# Patient Record
Sex: MALE | Race: WHITE | NOT HISPANIC OR LATINO | URBAN - METROPOLITAN AREA
[De-identification: names, ages, dates, MRNs, and addresses within clinical notes are randomized per-mention and may not be internally consistent; named-entity substitution may affect disease eponyms.]

---

## 2021-12-26 ENCOUNTER — EMERGENCY (EMERGENCY)
Facility: HOSPITAL | Age: 21
LOS: 1 days | Discharge: HOME | End: 2021-12-26
Attending: EMERGENCY MEDICINE | Admitting: EMERGENCY MEDICINE
Payer: COMMERCIAL

## 2021-12-26 VITALS
RESPIRATION RATE: 18 BRPM | OXYGEN SATURATION: 98 % | TEMPERATURE: 99 F | HEART RATE: 89 BPM | DIASTOLIC BLOOD PRESSURE: 84 MMHG | SYSTOLIC BLOOD PRESSURE: 128 MMHG

## 2021-12-26 VITALS
OXYGEN SATURATION: 95 % | SYSTOLIC BLOOD PRESSURE: 121 MMHG | DIASTOLIC BLOOD PRESSURE: 72 MMHG | WEIGHT: 179.9 LBS | TEMPERATURE: 102 F | HEART RATE: 135 BPM | HEIGHT: 66 IN | RESPIRATION RATE: 18 BRPM

## 2021-12-26 DIAGNOSIS — R00.2 PALPITATIONS: ICD-10-CM

## 2021-12-26 DIAGNOSIS — D69.0 ALLERGIC PURPURA: ICD-10-CM

## 2021-12-26 DIAGNOSIS — Z20.822 CONTACT WITH AND (SUSPECTED) EXPOSURE TO COVID-19: ICD-10-CM

## 2021-12-26 DIAGNOSIS — R00.0 TACHYCARDIA, UNSPECIFIED: ICD-10-CM

## 2021-12-26 LAB
ALBUMIN SERPL ELPH-MCNC: 4.7 G/DL — SIGNIFICANT CHANGE UP (ref 3.3–5)
ALP SERPL-CCNC: 107 U/L — SIGNIFICANT CHANGE UP (ref 40–120)
ALT FLD-CCNC: 29 U/L — SIGNIFICANT CHANGE UP (ref 10–45)
AMPHET UR-MCNC: NEGATIVE — SIGNIFICANT CHANGE UP
ANION GAP SERPL CALC-SCNC: 13 MMOL/L — SIGNIFICANT CHANGE UP (ref 5–17)
APPEARANCE UR: ABNORMAL
APTT BLD: 33.2 SEC — SIGNIFICANT CHANGE UP (ref 27.5–35.5)
AST SERPL-CCNC: 38 U/L — SIGNIFICANT CHANGE UP (ref 10–40)
BACTERIA # UR AUTO: PRESENT /HPF
BARBITURATES UR SCN-MCNC: NEGATIVE — SIGNIFICANT CHANGE UP
BASOPHILS # BLD AUTO: 0.07 K/UL — SIGNIFICANT CHANGE UP (ref 0–0.2)
BASOPHILS NFR BLD AUTO: 0.7 % — SIGNIFICANT CHANGE UP (ref 0–2)
BENZODIAZ UR-MCNC: NEGATIVE — SIGNIFICANT CHANGE UP
BILIRUB SERPL-MCNC: 0.7 MG/DL — SIGNIFICANT CHANGE UP (ref 0.2–1.2)
BILIRUB UR-MCNC: ABNORMAL
BUN SERPL-MCNC: 12 MG/DL — SIGNIFICANT CHANGE UP (ref 7–23)
CALCIUM SERPL-MCNC: 9.7 MG/DL — SIGNIFICANT CHANGE UP (ref 8.4–10.5)
CHLORIDE SERPL-SCNC: 98 MMOL/L — SIGNIFICANT CHANGE UP (ref 96–108)
CO2 SERPL-SCNC: 26 MMOL/L — SIGNIFICANT CHANGE UP (ref 22–31)
COCAINE METAB.OTHER UR-MCNC: NEGATIVE — SIGNIFICANT CHANGE UP
COLOR SPEC: YELLOW — SIGNIFICANT CHANGE UP
CREAT SERPL-MCNC: 1.03 MG/DL — SIGNIFICANT CHANGE UP (ref 0.5–1.3)
CRP SERPL-MCNC: 259.8 MG/L — HIGH (ref 0–4)
DIFF PNL FLD: ABNORMAL
EOSINOPHIL # BLD AUTO: 0.37 K/UL — SIGNIFICANT CHANGE UP (ref 0–0.5)
EOSINOPHIL NFR BLD AUTO: 3.4 % — SIGNIFICANT CHANGE UP (ref 0–6)
EPI CELLS # UR: SIGNIFICANT CHANGE UP /HPF (ref 0–5)
ERYTHROCYTE [SEDIMENTATION RATE] IN BLOOD: 45 MM/HR — HIGH
GLUCOSE SERPL-MCNC: 114 MG/DL — HIGH (ref 70–99)
GLUCOSE UR QL: NEGATIVE — SIGNIFICANT CHANGE UP
GRAN CASTS # UR COMP ASSIST: ABNORMAL /LPF
HCT VFR BLD CALC: 40.7 % — SIGNIFICANT CHANGE UP (ref 39–50)
HGB BLD-MCNC: 14.4 G/DL — SIGNIFICANT CHANGE UP (ref 13–17)
HIV 1+2 AB+HIV1 P24 AG SERPL QL IA: SIGNIFICANT CHANGE UP
IMM GRANULOCYTES NFR BLD AUTO: 0.3 % — SIGNIFICANT CHANGE UP (ref 0–1.5)
INR BLD: 1.37 — HIGH (ref 0.88–1.16)
KETONES UR-MCNC: 40 MG/DL
LACTATE SERPL-SCNC: 1 MMOL/L — SIGNIFICANT CHANGE UP (ref 0.5–2)
LEUKOCYTE ESTERASE UR-ACNC: NEGATIVE — SIGNIFICANT CHANGE UP
LYMPHOCYTES # BLD AUTO: 1.02 K/UL — SIGNIFICANT CHANGE UP (ref 1–3.3)
LYMPHOCYTES # BLD AUTO: 9.5 % — LOW (ref 13–44)
MCHC RBC-ENTMCNC: 29.6 PG — SIGNIFICANT CHANGE UP (ref 27–34)
MCHC RBC-ENTMCNC: 35.4 GM/DL — SIGNIFICANT CHANGE UP (ref 32–36)
MCV RBC AUTO: 83.6 FL — SIGNIFICANT CHANGE UP (ref 80–100)
METHADONE UR-MCNC: NEGATIVE — SIGNIFICANT CHANGE UP
MONOCYTES # BLD AUTO: 1.13 K/UL — HIGH (ref 0–0.9)
MONOCYTES NFR BLD AUTO: 10.5 % — SIGNIFICANT CHANGE UP (ref 2–14)
NEUTROPHILS # BLD AUTO: 8.11 K/UL — HIGH (ref 1.8–7.4)
NEUTROPHILS NFR BLD AUTO: 75.6 % — SIGNIFICANT CHANGE UP (ref 43–77)
NITRITE UR-MCNC: POSITIVE
NRBC # BLD: 0 /100 WBCS — SIGNIFICANT CHANGE UP (ref 0–0)
OPIATES UR-MCNC: NEGATIVE — SIGNIFICANT CHANGE UP
PCP SPEC-MCNC: SIGNIFICANT CHANGE UP
PCP UR-MCNC: NEGATIVE — SIGNIFICANT CHANGE UP
PH UR: 6.5 — SIGNIFICANT CHANGE UP (ref 5–8)
PLATELET # BLD AUTO: 193 K/UL — SIGNIFICANT CHANGE UP (ref 150–400)
POTASSIUM SERPL-MCNC: 3.6 MMOL/L — SIGNIFICANT CHANGE UP (ref 3.5–5.3)
POTASSIUM SERPL-SCNC: 3.6 MMOL/L — SIGNIFICANT CHANGE UP (ref 3.5–5.3)
PROT SERPL-MCNC: 8 G/DL — SIGNIFICANT CHANGE UP (ref 6–8.3)
PROT UR-MCNC: 30 MG/DL
PROTHROM AB SERPL-ACNC: 16.2 SEC — HIGH (ref 10.6–13.6)
RAPID RVP RESULT: SIGNIFICANT CHANGE UP
RBC # BLD: 4.87 M/UL — SIGNIFICANT CHANGE UP (ref 4.2–5.8)
RBC # FLD: 12 % — SIGNIFICANT CHANGE UP (ref 10.3–14.5)
RBC CASTS # UR COMP ASSIST: < 5 /HPF — SIGNIFICANT CHANGE UP
S PYO AG SPEC QL IA: NEGATIVE — SIGNIFICANT CHANGE UP
SARS-COV-2 RNA SPEC QL NAA+PROBE: SIGNIFICANT CHANGE UP
SODIUM SERPL-SCNC: 137 MMOL/L — SIGNIFICANT CHANGE UP (ref 135–145)
SP GR SPEC: 1.02 — SIGNIFICANT CHANGE UP (ref 1–1.03)
THC UR QL: NEGATIVE — SIGNIFICANT CHANGE UP
UROBILINOGEN FLD QL: 1 E.U./DL — SIGNIFICANT CHANGE UP
WBC # BLD: 10.73 K/UL — HIGH (ref 3.8–10.5)
WBC # FLD AUTO: 10.73 K/UL — HIGH (ref 3.8–10.5)
WBC UR QL: < 5 /HPF — SIGNIFICANT CHANGE UP

## 2021-12-26 PROCEDURE — 86780 TREPONEMA PALLIDUM: CPT

## 2021-12-26 PROCEDURE — 87081 CULTURE SCREEN ONLY: CPT

## 2021-12-26 PROCEDURE — 96375 TX/PRO/DX INJ NEW DRUG ADDON: CPT

## 2021-12-26 PROCEDURE — 83605 ASSAY OF LACTIC ACID: CPT

## 2021-12-26 PROCEDURE — 85652 RBC SED RATE AUTOMATED: CPT

## 2021-12-26 PROCEDURE — 87591 N.GONORRHOEAE DNA AMP PROB: CPT

## 2021-12-26 PROCEDURE — 71045 X-RAY EXAM CHEST 1 VIEW: CPT | Mod: 26

## 2021-12-26 PROCEDURE — 87389 HIV-1 AG W/HIV-1&-2 AB AG IA: CPT

## 2021-12-26 PROCEDURE — 76870 US EXAM SCROTUM: CPT | Mod: 26

## 2021-12-26 PROCEDURE — 87880 STREP A ASSAY W/OPTIC: CPT

## 2021-12-26 PROCEDURE — 99284 EMERGENCY DEPT VISIT MOD MDM: CPT | Mod: 25

## 2021-12-26 PROCEDURE — 84145 PROCALCITONIN (PCT): CPT

## 2021-12-26 PROCEDURE — 85730 THROMBOPLASTIN TIME PARTIAL: CPT

## 2021-12-26 PROCEDURE — 36415 COLL VENOUS BLD VENIPUNCTURE: CPT

## 2021-12-26 PROCEDURE — 96365 THER/PROPH/DIAG IV INF INIT: CPT

## 2021-12-26 PROCEDURE — 85025 COMPLETE CBC W/AUTO DIFF WBC: CPT

## 2021-12-26 PROCEDURE — 99285 EMERGENCY DEPT VISIT HI MDM: CPT

## 2021-12-26 PROCEDURE — 96361 HYDRATE IV INFUSION ADD-ON: CPT

## 2021-12-26 PROCEDURE — 93005 ELECTROCARDIOGRAM TRACING: CPT

## 2021-12-26 PROCEDURE — 87040 BLOOD CULTURE FOR BACTERIA: CPT

## 2021-12-26 PROCEDURE — 81001 URINALYSIS AUTO W/SCOPE: CPT

## 2021-12-26 PROCEDURE — 80053 COMPREHEN METABOLIC PANEL: CPT

## 2021-12-26 PROCEDURE — 80307 DRUG TEST PRSMV CHEM ANLYZR: CPT

## 2021-12-26 PROCEDURE — 87491 CHLMYD TRACH DNA AMP PROBE: CPT

## 2021-12-26 PROCEDURE — 76870 US EXAM SCROTUM: CPT

## 2021-12-26 PROCEDURE — 82962 GLUCOSE BLOOD TEST: CPT

## 2021-12-26 PROCEDURE — 85610 PROTHROMBIN TIME: CPT

## 2021-12-26 PROCEDURE — 71045 X-RAY EXAM CHEST 1 VIEW: CPT

## 2021-12-26 PROCEDURE — 0225U NFCT DS DNA&RNA 21 SARSCOV2: CPT

## 2021-12-26 PROCEDURE — 86140 C-REACTIVE PROTEIN: CPT

## 2021-12-26 RX ORDER — SODIUM CHLORIDE 9 MG/ML
1000 INJECTION INTRAMUSCULAR; INTRAVENOUS; SUBCUTANEOUS ONCE
Refills: 0 | Status: COMPLETED | OUTPATIENT
Start: 2021-12-26 | End: 2021-12-26

## 2021-12-26 RX ORDER — CEFPODOXIME PROXETIL 100 MG
1 TABLET ORAL
Qty: 14 | Refills: 0
Start: 2021-12-26 | End: 2022-01-01

## 2021-12-26 RX ORDER — KETOROLAC TROMETHAMINE 30 MG/ML
15 SYRINGE (ML) INJECTION ONCE
Refills: 0 | Status: DISCONTINUED | OUTPATIENT
Start: 2021-12-26 | End: 2021-12-26

## 2021-12-26 RX ORDER — ACETAMINOPHEN 500 MG
1000 TABLET ORAL ONCE
Refills: 0 | Status: COMPLETED | OUTPATIENT
Start: 2021-12-26 | End: 2021-12-26

## 2021-12-26 RX ORDER — CEFTRIAXONE 500 MG/1
1000 INJECTION, POWDER, FOR SOLUTION INTRAMUSCULAR; INTRAVENOUS ONCE
Refills: 0 | Status: COMPLETED | OUTPATIENT
Start: 2021-12-26 | End: 2021-12-26

## 2021-12-26 RX ADMIN — SODIUM CHLORIDE 1000 MILLILITER(S): 9 INJECTION INTRAMUSCULAR; INTRAVENOUS; SUBCUTANEOUS at 20:18

## 2021-12-26 RX ADMIN — Medication 15 MILLIGRAM(S): at 22:20

## 2021-12-26 RX ADMIN — Medication 1000 MILLIGRAM(S): at 18:40

## 2021-12-26 RX ADMIN — Medication 15 MILLIGRAM(S): at 20:59

## 2021-12-26 RX ADMIN — SODIUM CHLORIDE 1000 MILLILITER(S): 9 INJECTION INTRAMUSCULAR; INTRAVENOUS; SUBCUTANEOUS at 18:36

## 2021-12-26 RX ADMIN — CEFTRIAXONE 100 MILLIGRAM(S): 500 INJECTION, POWDER, FOR SOLUTION INTRAMUSCULAR; INTRAVENOUS at 22:22

## 2021-12-26 RX ADMIN — SODIUM CHLORIDE 1000 MILLILITER(S): 9 INJECTION INTRAMUSCULAR; INTRAVENOUS; SUBCUTANEOUS at 19:11

## 2021-12-26 RX ADMIN — SODIUM CHLORIDE 1000 MILLILITER(S): 9 INJECTION INTRAMUSCULAR; INTRAVENOUS; SUBCUTANEOUS at 22:00

## 2021-12-26 RX ADMIN — Medication 1000 MILLIGRAM(S): at 19:11

## 2021-12-26 RX ADMIN — CEFTRIAXONE 1000 MILLIGRAM(S): 500 INJECTION, POWDER, FOR SOLUTION INTRAMUSCULAR; INTRAVENOUS at 23:09

## 2021-12-26 NOTE — ED PROVIDER NOTE - CLINICAL SUMMARY MEDICAL DECISION MAKING FREE TEXT BOX
21 y/o M with onset of rash and fever and sore throat after covid booster on Thursday. 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 Consider triggered immune response due to booster, consider also by viral infection. Pt strep negative will re swab for strep as his initially hsp event occurred on setting of strep. Also consider hand foot mouth disease, screen for syphilis but no risk factors. Pt does not appear significantly unwell, highly doubt meningitis since pt is vaccinated for meningococcus. Initial plan for labs. hydration, Tylenol, Xray, UA, CXR, dispo pending workup and revaluation.

## 2021-12-26 NOTE — ED PROVIDER NOTE - CONSTITUTIONAL, MLM
normal... Pt appearing non toxic, awake, alert, oriented to person, place, time/situation and in no apparent distress.

## 2021-12-26 NOTE — ED ADULT NURSE REASSESSMENT NOTE - NS ED NURSE REASSESS COMMENT FT1
Patient a/oX3, anxious, c/o of generalized red rash and pustules, pruritic, w/ sore throat and headache, also c/o of groin pain, no dysuria.  Sinus tacchcyardia on EKG, other vitals stable.  Right AC PIv #20 in place, all labs, blood cultures sent.  NSS 2 L bolus, Tylenol PO adminsitered.  Brought to US in stable condition.  Results and disposition pending.

## 2021-12-26 NOTE — ED PROVIDER NOTE - PROGRESS NOTE DETAILS
Discussed with nephrology for a consultation US wnl, improving VS, pt non toxic appearing, no abd pain/discomfort, will advise close fu with peds/rheum this week, continue supportive care, nsaids. Mor: US wnl, improving VS, pt non toxic appearing, no abd pain/discomfort, will advise close fu with peds/rheum this week, continue supportive care, nsaids.strict return prec given.

## 2021-12-26 NOTE — ED ADULT NURSE REASSESSMENT NOTE - NS ED NURSE REASSESS COMMENT FT1
All labs, tests resulted, US resulted, Ceftriaxone , NSS bolus completed.  No c/o of headache or any other pain, vital signs stable.  Discharged to home in stable condition.

## 2021-12-26 NOTE — ED PROVIDER NOTE - MUSCULOSKELETAL, MLM
Spine appears normal, range of motion is not limited, no muscle or joint tenderness no neck tenderness or stiffness no meningeal signs

## 2021-12-26 NOTE — ED PROVIDER NOTE - NSFOLLOWUPINSTRUCTIONS_ED_ALL_ED_FT
PLEASE RETURN FOR ANY WORSENING RASH, FEVER, CHILLS, ABDOMINAL PAIN OR ANY OTHER CONCERNS.      Henoch-Schonlein Purpura    WHAT YOU NEED TO KNOW:    Henoch-Schonlein purpura (HSP) is a condition that causes your immune system to attack and damage your blood vessels. Damage to your blood vessels causes them to swell and bleed. HSP most commonly affects the blood vessels in your skin, joints, intestines, and kidneys. HSP can happen at any age but is most common in children 2 to 11 years of age. There is no treatment for HSP. HSP may eventually get better or become a chronic condition.     DISCHARGE INSTRUCTIONS:    Call 911 or have someone else call for any of the following:   •You have a seizure.       •You have trouble breathing.      Seek care immediately if:   •You are confused.       •You have a bruise that suddenly gets bigger.       •You feel dizzy or weak.       •Your abdomen looks bigger than usual and is very painful.       •You vomit blood or liquid that looks like coffee grounds.       •You stop urinating or urinate a lot less than usual.       •You have swelling in your face, arms, or legs.       Contact your healthcare provider if:   •You have a fever.      •You have blood in your bowel movements or urine.       •You cannot stop vomiting.       •You have questions or concerns about your condition or care.      Medicines: You may need any of the following:   •Medicine may be given to decrease swelling. You may also be given medicine to stop your immune system from attacking your blood vessels.       •Acetaminophen decreases pain and fever. It is available without a doctor's order. Ask how much to take and how often to take it. Follow directions. Read the labels of all other medicines you are using to see if they also contain acetaminophen, or ask your doctor or pharmacist. Acetaminophen can cause liver damage if not taken correctly. Do not use more than 4 grams (4,000 milligrams) total of acetaminophen in one day.       •NSAIDs, such as ibuprofen, help decrease swelling, pain, and fever. This medicine is available with or without a doctor's order. NSAIDs can cause stomach bleeding or kidney problems in certain people. If you take blood thinner medicine, always ask your healthcare provider if NSAIDs are safe for you. Always read the medicine label and follow directions.      •Take your medicine as directed. Contact your healthcare provider if you think your medicine is not helping or if you have side effects. Tell him of her if you are allergic to any medicine. Keep a list of the medicines, vitamins, and herbs you take. Include the amounts, and when and why you take them. Bring the list or the pill bottles to follow-up visits. Carry your medicine list with you in case of an emergency.      Manage your symptoms:   •Apply ice on your joints for 15 to 20 minutes every hour or as directed. Use an ice pack, or put crushed ice in a plastic bag. Cover it with a towel. Ice helps prevent tissue damage and decreases swelling and pain.      •Apply heat on your joints for 20 to 30 minutes every 2 hours for as many days as directed. Heat helps decrease pain.       •Elevate your joint above the level of your heart as often as you can. This will help decrease swelling and pain. Prop your joint on pillows or blankets to keep it elevated comfortably.       •Drink plenty of liquids as directed. Liquids help prevent dehydration from vomiting or diarrhea. Ask how much liquid to drink each day and which liquids are best for you.       •Rest as directed to help your body heal. Ask your healthcare provider when you can return to your normal activities.       Follow up with your healthcare provider as directed: You will need to return for blood or urine tests. Write down your questions so you remember to ask them during your visits.

## 2021-12-26 NOTE — ED PROVIDER NOTE - ENMT, MLM
Airway patent, Nasal mucosa clear. Mouth with normal mucosa. Throat has no vesicles, no oropharyngeal exudates and uvula is midline. Mild  erythematous throat, top rash on roof of mouth

## 2021-12-26 NOTE — ED PROVIDER NOTE - SKIN, MLM
Skin normal color for race, warm, dry and intact. Diffuse puric macular rash, non blanching no vesciles including palms and soles, no ulceration

## 2021-12-26 NOTE — ED ADULT TRIAGE NOTE - CHIEF COMPLAINT QUOTE
Pt presents to ED co fever, dizziness, palpitations and rash after receiving the covid booster on Thurday 12/23. Rash initially began in hands now on hands, legs and torso. Denies SOB.

## 2021-12-26 NOTE — ED ADULT NURSE NOTE - OBJECTIVE STATEMENT
Patient c/o of rash on hands and legs, now on torso and abdomen, no SOB, no fever or chills , received Covid vaccine, c/o of dizziness and palpitions, took Tylenol PO 1 hour ago. Patient c/o of rash on hands, noted raised red rash and pustules, started 3 days ago after receiving Pfizer Covid vaccine, spread to legs and feet with swelling the next today, today  now on torso, groin and abdomen, states itching, no discharge from site.  Also c/o of headache, sore throat, with nausea, no vomitting, c/o of dizziness and palpitations, no SOB, with fever 101o yesterday, 102o today,, took Tylenol PO 1 hour ago, and taking Benadryl PO every 6 hours.  Went to  and sent to ED, states rapid Covid and rapid Strep negative.  Immediate upgrade to Dr. Valentin due to fever and elevated .

## 2021-12-26 NOTE — ED PROVIDER NOTE - PATIENT PORTAL LINK FT
You can access the FollowMyHealth Patient Portal offered by [facility] by registering at the following website: [email or web address]. By joining [employer]’s FollowMyHealth portal, you will also be able to view your health information using other applications (apps) compatible with our system.

## 2021-12-26 NOTE — ED PROVIDER NOTE - OBJECTIVE STATEMENT
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 Pt had went last Thursday to get PCR to rule out COVID and was asymptomatic. Pt then received the booster vaccine and given the Pfizer booster shot and had prior doses of Moderna vaccine. Pt had no negative reactions, no significant reactions to Moderna vaccine. After vaccine pt noticed later that night he developed a new full body rash on hands, palms, and soles as well as groin and co of sore throat/mouth sores. Ever since he complains of having a fever and mild headache. Pt denies any neck stiffness, nausea, vomiting, diarrhea, no joint pain but is complaining of fatigue. Pt denies being sexually active, or any IV drug usage. Has been vaccinated for meningococcus.

## 2021-12-27 LAB — T PALLIDUM AB TITR SER: NEGATIVE — SIGNIFICANT CHANGE UP

## 2021-12-28 LAB
C TRACH RRNA SPEC QL NAA+PROBE: SIGNIFICANT CHANGE UP
N GONORRHOEA RRNA SPEC QL NAA+PROBE: SIGNIFICANT CHANGE UP
SPECIMEN SOURCE: SIGNIFICANT CHANGE UP

## 2021-12-31 LAB
CULTURE RESULTS: SIGNIFICANT CHANGE UP
CULTURE RESULTS: SIGNIFICANT CHANGE UP
SPECIMEN SOURCE: SIGNIFICANT CHANGE UP
SPECIMEN SOURCE: SIGNIFICANT CHANGE UP

## 2023-12-02 ENCOUNTER — HOSPITAL ENCOUNTER (EMERGENCY)
Facility: HOSPITAL | Age: 23
Discharge: HOME | End: 2023-12-02
Attending: EMERGENCY MEDICINE | Admitting: EMERGENCY MEDICINE
Payer: COMMERCIAL

## 2023-12-02 VITALS
HEART RATE: 96 BPM | SYSTOLIC BLOOD PRESSURE: 125 MMHG | RESPIRATION RATE: 20 BRPM | DIASTOLIC BLOOD PRESSURE: 57 MMHG | OXYGEN SATURATION: 97 % | TEMPERATURE: 97.2 F

## 2023-12-02 DIAGNOSIS — F10.920 ALCOHOLIC INTOXICATION WITHOUT COMPLICATION (HCC): Primary | ICD-10-CM

## 2023-12-02 PROCEDURE — 99283 EMERGENCY DEPT VISIT LOW MDM: CPT

## 2023-12-02 PROCEDURE — 99284 EMERGENCY DEPT VISIT MOD MDM: CPT | Performed by: EMERGENCY MEDICINE

## 2023-12-02 NOTE — ED ATTENDING ATTESTATION
12/2/2023  IJeffery MD, saw and evaluated the patient. I have discussed the patient with the resident/non-physician practitioner and agree with the resident's/non-physician practitioner's findings, Plan of Care, and MDM as documented in the resident's/non-physician practitioner's note, except where noted. All available labs and Radiology studies were reviewed. I was present for key portions of any procedure(s) performed by the resident/non-physician practitioner and I was immediately available to provide assistance. At this point I agree with the current assessment done in the Emergency Department. I have conducted an independent evaluation of this patient a history and physical is as follows:    ED Course     Emergency Department Note- Graham Pemberton 25 y.o. male MRN: [id number]    Unit/Bed#: [facility] Encounter: [id number]    Graham Pemberton is a 25 y.o. male who presents with   Chief Complaint   Patient presents with    Alcohol Intoxication     Patient arrived via EMS, was picked up outside restaurant after throwing up outside, VSS en route         History of Present Illness   HPI:  Graham Pemberton is a 25 y.o. male who presents for evaluation of:  Acute alcohol intoxication. Patient was noted to be vomiting outside of a restaurant when EMS picked him up to transport him to the ED. Patient notes that he was drinking alcohol earlier prior to arrival.  Patient did fall and had head strike. He denies loss of consciousness, headache, and neck pain.     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 No pertinent family history. Meds/Allergies   PTA meds:   Prior to Admission Medications   Prescriptions Last Dose Informant Patient Reported? Taking?   sertraline (ZOLOFT) 50 mg tablet   Yes No   Sig: Take 50 mg by mouth      Facility-Administered Medications: None     Allergies   Allergen Reactions    Atropine Other (See Comments)       Objective   First Vitals:   Blood Pressure: 125/57 (12/02/23 0241)  Pulse: 96 (12/02/23 0241)  Temperature: (!) 97.2 °F (36.2 °C) (12/02/23 0242)  Temp Source: Oral (12/02/23 0241)  Respirations: 20 (12/02/23 0241)  SpO2: 97 % (12/02/23 0241)    Current Vitals:   Blood Pressure: 125/57 (12/02/23 0241)  Pulse: 96 (12/02/23 0241)  Temperature: (!) 97.2 °F (36.2 °C) (12/02/23 0242)  Temp Source: Axillary (12/02/23 0242)  Respirations: 20 (12/02/23 0241)  SpO2: 97 % (12/02/23 0241)    No intake or output data in the 24 hours ending 12/02/23 0316    Invasive Devices       None                   Physical Exam  Vitals and nursing note reviewed. Constitutional:       General: He is in acute distress (x secondary to vomiting). Appearance: Normal appearance. He is well-developed. HENT:      Head: Normocephalic and atraumatic. Right Ear: External ear normal.      Left Ear: External ear normal.      Nose: Nose normal.      Mouth/Throat:      Pharynx: No oropharyngeal exudate. Eyes:      Conjunctiva/sclera: Conjunctivae normal.      Pupils: Pupils are equal, round, and reactive to light. Cardiovascular:      Rate and Rhythm: Normal rate and regular rhythm. Pulmonary:      Effort: Pulmonary effort is normal. No respiratory distress. Abdominal:      General: Abdomen is flat. There is no distension. Palpations: Abdomen is soft. Musculoskeletal:         General: No deformity. Normal range of motion. Cervical back: Normal range of motion and neck supple. Skin:     General: Skin is warm and dry. Capillary Refill: Capillary refill takes less than 2 seconds. Neurological:      General: No focal deficit present. Mental Status: He is alert. He is disoriented. Coordination: Coordination normal.   Psychiatric:      Comments: Decision-making capacity, thought content, and judgment are impaired secondary to alcohol intoxication           Medical Decision Makin. Acute alcohol intoxication associated with nausea and vomiting: Antiemetics as needed; encourage p.o. fluids; observe until sober. No results found for this or any previous visit (from the past 36 hour(s)). No orders to display         Portions of the record may have been created with voice recognition software. Occasional wrong word or "sound a like" substitutions may have occurred due to the inherent limitations of voice recognition software. Read the chart carefully and recognize, using context, where substitutions have occurred.         Critical Care Time  Procedures

## 2023-12-02 NOTE — ED CARE HANDOFF
Emergency Department Sign Out Note        Sign out and transfer of care from Dr Martita Coulter. See Separate Emergency Department note. The patient, Larissa Diop, was evaluated by the previous provider for alcohol intoxication. Workup Completed:  N/A    ED Course / Workup Pending (followup):  Clinical sobriety             ED Course as of 12/02/23 1025   Sat Dec 02, 2023   0708 SO: can go home when clinically sober    0906 On reassessment, pt A+O x 3, complaint free. Amble to ambulate without assistance. Pt will call friends for ride home. Procedures  Medical Decision Making  Pt clinically sober on reassessment. friend came to pick him up, pt will not drive. Return precautions given, all questions answered. Pt stable for discharge. Disposition  Final diagnoses:   Alcoholic intoxication without complication ([address])     Time reflects when diagnosis was documented in both MDM as applicable and the Disposition within this note       Time User Action Codes Description Comment    12/2/2023  9:36 AM Hortensia Dennis Add [V73.650] Alcoholic intoxication without complication [facility])           ED Disposition       ED Disposition   Discharge    Condition   Stable    Date/Time   Sat Dec 2, 2023  9:36 AM    Comment   Larissa Diop discharge to home/self care. Follow-up Information    None       Discharge Medication List as of 12/2/2023 10:14 AM        CONTINUE these medications which have NOT CHANGED    Details   sertraline (ZOLOFT) 50 mg tablet Take 50 mg by mouth, Historical Med           No discharge procedures on file.        ED Provider  Electronically Signed by     Hortensia Dennis MD  12/02/23 1025

## 2023-12-02 NOTE — ED PROVIDER NOTES
History  Chief Complaint   Patient presents with    Alcohol Intoxication     Patient arrived via EMS, was picked up outside restaurant after throwing up outside, VSS en route     HPI  Dory Pallas is a 25 y.o. male who presents to the emergency department with alcohol intoxication. Patient reports drinking too much alcohol and then eating food with subsequent vomiting. Patient brought in by EMS for vomiting outside a restaurant. Patient denies falls or trauma, and denies drug use. Prior to Admission Medications   Prescriptions Last Dose Informant Patient Reported? Taking?   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 No pertinent family history. I have reviewed and agree with the history as documented. E-Cigarette/Vaping    E-Cigarette Use Never User      E-Cigarette/Vaping Substances    Nicotine No     THC No     CBD No     Flavoring No     Other No     Unknown No      Social History     Tobacco Use    Smoking status: Never     Passive exposure: Never    Smokeless tobacco: Never   Vaping Use    Vaping Use: Never used   Substance Use Topics    Alcohol use: Yes    Drug use: Never       Home medications:  Prior to Admission Medications   Prescriptions Last Dose Informant Patient Reported? Taking?   sertraline (ZOLOFT) 50 mg tablet   Yes No   Sig: Take 50 mg by mouth      Facility-Administered Medications: None     Allergies:   Allergies   Allergen Reactions    Atropine Other (See Comments)        Review of Systems   Unable to perform ROS: Mental status change       Physical Exam  ED Triage Vitals   Temperature Pulse Respirations Blood Pressure SpO2   12/02/23 0242 12/02/23 0241 12/02/23 0241 12/02/23 0241 12/02/23 0241   (!) 97.2 °F (36.2 °C) 96 20 125/57 97 %      Temp Source Heart Rate Source Patient Position - Orthostatic VS BP Location FiO2 (%)   12/02/23 0241 12/02/23 0241 12/02/23 0241 12/02/23 0241 --   Oral Monitor Lying Right arm       Pain Score       --                    Orthostatic Vital Signs  Vitals:    12/02/23 0241   BP: 125/57   Pulse: 96   Patient Position - Orthostatic VS: Lying       Physical Exam  Vitals and nursing note reviewed. Constitutional:       General: He is not in acute distress. Appearance: He is not toxic-appearing. HENT:      Head: Normocephalic. Mouth/Throat:      Mouth: Mucous membranes are moist.   Eyes:      Pupils: Pupils are equal, round, and reactive to light. Cardiovascular:      Rate and Rhythm: Normal rate. Pulmonary:      Effort: Pulmonary effort is normal. No respiratory distress. Abdominal:      General: Abdomen is flat. There is no distension. Comments: Vomit stains on clothes   Skin:     General: Skin is warm and dry. Neurological:      Mental Status: He is alert. Comments: Answering questions appropriately         ED Medications  Medications - No data to display    Diagnostic Studies  Results Reviewed       None                   No orders to display         Procedures  Procedures      ED Course                                       MDM  Medical Decision Making    Krystal Lloyd is a 25 y.o. male who presents to the emergency department with alcohol intoxication. Workup including vital signs, physical exam.  Patient observed overnight in the emergency department. Patient signed out at end of shift, per chart review patient discharged when clinically sober.       Disposition  Final diagnoses:   Alcoholic intoxication without complication ([address])     Time reflects when diagnosis was documented in both MDM as applicable and the Disposition within this note       Time User Action Codes Description Comment    12/2/2023  9:36 AM Daniele Valencia Add [F10.920] Alcoholic intoxication without complication [facility])           ED Disposition       ED Disposition   Discharge    Condition   Stable    Date/Time   Sat Dec 2, 2023  9:36 AM    Comment   Larissa Diop discharge to home/self care. Follow-up Information    None         Discharge Medication List as of 12/2/2023 10:14 AM        CONTINUE these medications which have NOT CHANGED    Details   sertraline (ZOLOFT) 50 mg tablet Take 50 mg by mouth, Historical Med             No discharge procedures on file. PDMP Review       None             ED Provider  Attending physically available and evaluated Larissa Diop. I managed the patient along with the ED Attending. Electronically Signed by    Portions of the record may have been created with voice recognition software. Occasional wrong word or "sound a like" substitutions may have occurred due to the inherent limitations of voice recognition software.   Read the chart carefully and recognize, using context, where substitutions have occurred       Any Boland MD  12/04/23 0029

## 2023-12-02 NOTE — DISCHARGE INSTRUCTIONS
Please schedule an appointment with your PCP for follow-up     Drink plenty of fluids, eat food as tolerated, DO not drive today    Please return to the ER if you experience any concerning symptoms.

## 2024-12-10 NOTE — ED ADULT TRIAGE NOTE - NS ED TRIAGE CLINICAL UPGRADE
General Sunscreen Counseling: I recommended a broad spectrum sunscreen with a SPF of 30 or higher.  I explained that SPF 30 sunscreens block approximately 97 percent of the sun's harmful rays.  Sunscreens should be applied at least 15 minutes prior to expected sun exposure and then every 2 hours after that as long as sun exposure continues. If swimming or exercising sunscreen should be reapplied every 45 minutes to an hour after getting wet or sweating.  One ounce, or the equivalent of a shot glass full of sunscreen, is adequate to protect the skin not covered by a bathing suit. I also recommended a lip balm with a sunscreen as well. Sun protective clothing can be used in lieu of sunscreen but must be worn the entire time you are exposed to the sun's rays. Detail Level: Detailed Deteriorating patient status - Patient was clinically upgraded due to deteriorating patient status.

## 2025-05-12 NOTE — ED ADULT TRIAGE NOTE - NSTRIAGECARE_GEN_A_ER
May from [facility] called about patient wanting to inform the provider that the patient is being re certified for home care to ensure things continue to heal correctly. Wanted to let us know that we will be receiving a fax about this shortly as well.   Face Mask